# Patient Record
Sex: FEMALE | Race: OTHER | Employment: UNEMPLOYED | URBAN - METROPOLITAN AREA
[De-identification: names, ages, dates, MRNs, and addresses within clinical notes are randomized per-mention and may not be internally consistent; named-entity substitution may affect disease eponyms.]

---

## 2020-07-06 ENCOUNTER — HOSPITAL ENCOUNTER (EMERGENCY)
Age: 30
Discharge: HOME | End: 2020-07-06
Payer: MEDICAID

## 2020-07-06 ENCOUNTER — APPOINTMENT (OUTPATIENT)
Dept: GENERAL RADIOLOGY | Age: 30
End: 2020-07-06
Payer: MEDICAID

## 2020-07-06 VITALS
SYSTOLIC BLOOD PRESSURE: 113 MMHG | OXYGEN SATURATION: 100 % | RESPIRATION RATE: 17 BRPM | HEIGHT: 61 IN | BODY MASS INDEX: 21.71 KG/M2 | HEART RATE: 82 BPM | WEIGHT: 115 LBS | TEMPERATURE: 98 F | DIASTOLIC BLOOD PRESSURE: 63 MMHG

## 2020-07-06 PROCEDURE — 6370000000 HC RX 637 (ALT 250 FOR IP): Performed by: NURSE PRACTITIONER

## 2020-07-06 PROCEDURE — 99283 EMERGENCY DEPT VISIT LOW MDM: CPT

## 2020-07-06 PROCEDURE — 73610 X-RAY EXAM OF ANKLE: CPT

## 2020-07-06 RX ORDER — HYDROCODONE BITARTRATE AND ACETAMINOPHEN 5; 325 MG/1; MG/1
1 TABLET ORAL EVERY 6 HOURS PRN
Qty: 10 TABLET | Refills: 0 | Status: SHIPPED | OUTPATIENT
Start: 2020-07-06 | End: 2020-07-09

## 2020-07-06 RX ORDER — HYDROCODONE BITARTRATE AND ACETAMINOPHEN 5; 325 MG/1; MG/1
1 TABLET ORAL ONCE
Status: COMPLETED | OUTPATIENT
Start: 2020-07-06 | End: 2020-07-06

## 2020-07-06 RX ADMIN — HYDROCODONE BITARTRATE AND ACETAMINOPHEN 1 TABLET: 5; 325 TABLET ORAL at 13:13

## 2020-07-06 ASSESSMENT — PAIN DESCRIPTION - PAIN TYPE: TYPE: ACUTE PAIN

## 2020-07-06 ASSESSMENT — ENCOUNTER SYMPTOMS
ABDOMINAL PAIN: 0
COLOR CHANGE: 0
SHORTNESS OF BREATH: 0
RHINORRHEA: 0
SORE THROAT: 0

## 2020-07-06 ASSESSMENT — PAIN SCALES - GENERAL
PAINLEVEL_OUTOF10: 7
PAINLEVEL_OUTOF10: 7

## 2020-07-06 ASSESSMENT — PAIN DESCRIPTION - ORIENTATION: ORIENTATION: RIGHT

## 2020-07-06 ASSESSMENT — PAIN DESCRIPTION - LOCATION: LOCATION: ANKLE

## 2020-07-06 NOTE — ED PROVIDER NOTES
Evaluated by Advanced Practice Provider          [facility]  EMERGENCY DEPARTMENT ENCOUNTER        Pt Name: Deyanira Cottrell  MRN: [id number]  [place] 1990  Dateof evaluation: 7/6/2020  Provider: Sonny Peoples, APRN - CNP  PCP: No primary care provider on file. ED Attending: No att. providers found    [address] [facility]       Chief Complaint   Patient presents with    Ankle Pain     Right Ankle injury friday       HISTORY OF PRESENTILLNESS   (Location/Symptom, Timing/Onset, Context/Setting, Quality, Duration, Modifying Factors, Severity)  Note limiting factors. Deyanira Cottrell is a 27 y.o. female for right ankle pain. Onset was 3 days ago. Duration has been since the onset. Context includes pt states she twisted her right ankle on Friday and is still having increased pain. Alleviating factors include nothing. Aggravating factors include nothing. Pain is 7/10. tylenol has been used for pain today. Nursing Notes were all reviewed and agreed with or any disagreements were addressed  in the HPI. REVIEW OF SYSTEMS    (2-9 systems for level 4, 10 or more for level 5)     Review of Systems   Constitutional: Negative for fever. HENT: Negative for congestion, rhinorrhea and sore throat. Respiratory: Negative for shortness of breath. Cardiovascular: Negative for chest pain. Gastrointestinal: Negative for abdominal pain. Genitourinary: Negative for decreased urine volume and difficulty urinating. Musculoskeletal: Negative for arthralgias and myalgias. Right ankle pain   Skin: Negative for color change and rash. Neurological: Negative for dizziness and light-headedness. Psychiatric/Behavioral: Negative for agitation. All other systems reviewed and are negative. Positives and Pertinent negatives as per HPI. Except as noted above in the ROS, all other systems were reviewed and negative.        PAST MEDICAL HISTORY   No past medical history on lateral malleolus of right fibula, initial encounter    2. Closed nondisplaced fracture of medial malleolus of right tibia, initial encounter          DISPOSITION/PLAN   DISPOSITION Decision To Discharge 07/06/2020 02:22:02 PM      PATIENT REFERRED TO:  [facility]) Pre-Services  [phone]  Schedule an appointment as soon as possible for a visit in 1 week  to establish primary care    [facility] ED  [address] [address]  [phone]    If symptoms worsen      DISCHARGE MEDICATIONS:  Discharge Medication List as of 7/6/2020  2:14 PM      START taking these medications    Details   HYDROcodone-acetaminophen (NORCO) 5-325 MG per tablet Take 1 tablet by mouth every 6 hours as needed for Pain for up to 3 days. , Disp-10 tablet, R-0Print             DISCONTINUED MEDICATIONS:  Discharge Medication List as of 7/6/2020  2:14 PM                 (Please note that portions of this note were completed with a voice recognition program.  Efforts were made to edit the dictations but occasionally words are mis-transcribed.)    TALI Yu CNP (electronically signed)         TALI Yu CNP  07/06/20 [id number]

## 2020-07-06 NOTE — ED NOTES
Discharge instructions reviewed with Ms. Beach. She verbalized understanding. Copy of discharge instructions and prescriptions given. Ms. Page Ervin was discharged to home in good condition per personal vehicle, friend/family driving. She exited the ED without difficulty.         Pal Tripp RN  07/06/20 [id number]